# Patient Record
(demographics unavailable — no encounter records)

---

## 2025-03-07 NOTE — HISTORY OF PRESENT ILLNESS
[FreeTextEntry1] : rpa: rash on her back [de-identified] : 68F last seen June 2022 by Dr. Vazquez, presenting today for a rash on her back that started in January. Itching on the right mid-lower back started in January when she was diagnosed with cholelithiasis. Was seen in the hospital at [place] and they ruled out HZV, evaluating the itchy spot on her back. Sometimes feels burning pain. No treatments tried. No history of back surgery, nerve pain.  On Synthroid for Grave's, Prolia for osteoporosis. Sulfite and EDTA allergy.  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

## 2025-03-07 NOTE — PHYSICAL EXAM
[FreeTextEntry3] : Focused skin exam performed  The relevant portions of the exam were performed today  AAOx3, NAD, well-appearing / pleasant Focused examination within normal limits with the exception of:  Pink slightly scaly papules with some hyperpigmented macules on the right inferior scapula

## 2025-03-07 NOTE — HISTORY OF PRESENT ILLNESS
[FreeTextEntry1] : rpa: rash on her back [de-identified] : 68F last seen June 2022 by Dr. Vazquez, presenting today for a rash on her back that started in January. Itching on the right mid-lower back started in January when she was diagnosed with cholelithiasis. Was seen in the hospital at [place] and they ruled out HZV, evaluating the itchy spot on her back. Sometimes feels burning pain. No treatments tried. No history of back surgery, nerve pain.  On Synthroid for Grave's, Prolia for osteoporosis. Sulfite and EDTA allergy.  Nurse at [employer].  is an .

## 2025-03-07 NOTE — ASSESSMENT
[FreeTextEntry1] : 1. Strongly favor notalgia paresthetica Chronic, flaring - On chart review, Pt with history of L5S1 degenerative disc disease - Diagnosis reviewed.  - Start TAC 0.1% ointment BID to affected areas on body for 2 weeks max. Proper medication use and side effects discussed, including cutaneous atrophy and striae. Avoid long-term use; do not use on face, axillae, groin. Do not use as moisturizer. - Start otc Sarna lotion to affected area at least daily, can use as often as needed. Keep in the refrigerator for cooling relief.  F/u in 6 wks w/ Dr. Vazquez for f/u and FBSE as scheduled.

## 2025-03-21 NOTE — PHYSICAL EXAM
[Alert] : alert [Well Nourished] : well nourished [No Acute Distress] : no acute distress [Well Developed] : well developed [Normal Sclera/Conjunctiva] : normal sclera/conjunctiva [EOMI] : extra ocular movement intact [No Proptosis] : no proptosis [Normal Oropharynx] : the oropharynx was normal [Thyroid Not Enlarged] : the thyroid was not enlarged [No Thyroid Nodules] : no palpable thyroid nodules [No Respiratory Distress] : no respiratory distress [No Accessory Muscle Use] : no accessory muscle use [Clear to Auscultation] : lungs were clear to auscultation bilaterally [Normal S1, S2] : normal S1 and S2 [Normal Rate] : heart rate was normal [Regular Rhythm] : with a regular rhythm [No Edema] : no peripheral edema [Normal Bowel Sounds] : normal bowel sounds [Not Tender] : non-tender [Not Distended] : not distended [Soft] : abdomen soft [Normal Anterior Cervical Nodes] : no anterior cervical lymphadenopathy [No Spinal Tenderness] : no spinal tenderness [Spine Straight] : spine straight [No Stigmata of Cushings Syndrome] : no stigmata of Cushings Syndrome [Normal Gait] : normal gait [Normal Reflexes] : deep tendon reflexes were 2+ and symmetric [No Tremors] : no tremors [Oriented x3] : oriented to person, place, and time

## 2025-03-21 NOTE — HISTORY OF PRESENT ILLNESS
[FreeTextEntry1] :  Pt returns for a follow-up visit for osteoporosis. Pt began Prolia 03/2024. Pt went to [facility] 01/2025 due to upper back pain and was diagnosed with gallstones. Chest x-ray indicated no fractures. Up to date with dentist. No major dental work planned.  Pt present with low bone density for several years. Pt previously saw Dr. Conchita Jain. Dr. Jain offered treatment but pt did not begin. Pt elected to get a second opinion. Pt was never treated with any osteoporosis medication. BMD 11/2022 indicates Spine -3.4 osteoporosis. Total Hip -2.3 osteopenia. Femoral Neck -2.6 osteoporosis. BMD results reviewed w/pt.  BMD suggests that osteoporosis is present in the spine and femoral neck. No family hx of osteoporosis. The patient has no unusual risk factors for osteoporosis. Patient advised for an increased risk of future fx. Pt began Tymlos 02/2023, tolerated well. BMD 02/2024 indicates worsened osteoporosis in the spine and fem neck, stable osteopenia in total hip, osteoporosis in prox. radius. BMD results reviewed w/pt. Bone density has decreased, pt began Prolia 03/2024, tolerating well.   No thigh pain, no interval fx. Normal Ca. No ONJ. BMD 03/2025 indicates improved osteoporosis in the spine and fem neck, improved osteopenia in total hip, stable osteoporosis in prox. radius. BMD results reviewed w/pt.   Family hx of breast cancer. Pt was never offered any treatment for primary prevention for breast cancer.   History of graves disease in 2018 treated with radioactive iodine. 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  No current symptoms of hyperthyroidism or hypothyroidism.  No history of thyroid eye disease.  Menopause age 52.  Pt has no Hx of kidney stones or calcium issues. No Hx of anorexia nervosa. No ulcers or bleeding. No unusual risks for osteoporosis such as calcium disorders. No Hx of RT. No chronic prednisone use. No Hx of Paget's disease. No Hx of DVT or PE. Up to date with routine care. Menopause 52. Last DDS within 6 months   History of benign adrenal adenoma 2010 noted incidentally another imaging.  Details are not available.  No clinical suggestion of functional adenoma.  Pt had shortness of breath and went to [facility]. Pt had a full workup which was negative. Pt had a fall in April 2024 and went to [facility]. Pt had a CT scan which came back negative.

## 2025-03-21 NOTE — PROCEDURE
[FreeTextEntry1] : Bone Mineral Density: 03/21/2025 Indication: Comparison to 2024, assess response to change in medication Spine: -3.4, osteoporosis, +6.8% Total hip: -1.9, osteopenia, +9.2% Femoral neck: -2.7, osteoporosis, +7.0% Proximal radius:  -2.7, osteoporosis, no significant change  Bone Mineral Density: 02/20/2024 Indication: Comparison to outside study 2022, assess response to medication Spine: L2-4, -3.8, osteoporosis, prior report -3.4 Total hip: -2.4, osteopenia, no significant change Femoral neck: -3.0, osteoporosis, prior report -2.6, change in analysis          Proximal radius: -2.6, osteoporosis, no prior   Thoracic and lumbar spine x-ray December 2022: no fractures   Bone Density November 2022  Spine -3.4 osteoporosis  Total Hip -2.3 osteopenia  Femoral Neck -2.6 osteoporosis

## 2025-03-21 NOTE — ASSESSMENT
[Denosumab Therapy] : Risks  and benefits of denosumab therapy were discussed with the patient including eczema, cellulitis, osteonecrosis of the jaw and atypical femur fractures [FreeTextEntry1] : 67 y/o female returns for a follow-up visit for osteoporosis.  Pt present with low bone density for several years. Pt previously saw Dr. Conchita Jain. Dr. Jain offered treatment but pt did not begin. Pt elected to get a second opinion. Pt was never treated with any osteoporosis medication. BMD 11/2022 indicates Spine -3.4 osteoporosis. Total Hip -2.3 osteopenia. Femoral Neck -2.6 osteoporosis. BMD results reviewed w/pt.  BMD suggests that osteoporosis is present in the spine and femoral neck. No family hx of osteoporosis.  The patient has no unusual risk factors for osteoporosis. Patient advised for an increased risk of future fx. Pt began Tymlos 02/2023, tolerated well. BMD 02/2024 indicates worsened osteoporosis in the spine and fem neck, stable osteopenia in total hip, osteoporosis in prox. radius. BMD results reviewed w/pt. Bone density has decreased, pt began Prolia 03/2024, tolerating well.   No thigh pain, no interval fx. Normal Ca. No ONJ. BMD 03/2025 indicates improved osteoporosis in the spine and fem neck, improved osteopenia in total hip, stable osteoporosis in prox. radius. BMD results reviewed w/pt. Continue Prolia from [facility].   History of post ablative hypothyroidism after radioactive iodine for Graves' disease.  Clinically and biochemically euthyroid on levothyroxine 88 mcg/day.  Family history of breast cancer in sister. Medical therapy as primary prevention of breast cancer including tamoxifen and raloxifene discussed.  Although raloxifene is FDA approved for osteoporosis this would not be adequately potent enough given her osteoporosis, but she can consider it for primary prevention.  Risks and benefits reviewed patient wishes to consider this further.  Labs: January 2025 Creatinine: 0.79  Calcium: 10.0  Request labs sent out   F/u in 6 months for Prolia

## 2025-03-21 NOTE — END OF VISIT
[FreeTextEntry3] :  This note was written by Lalita Grace on (March 21, 2025) acting as a medical scribe for Dr. Grant This note was authored by the medical scribe for me. I have reviewed, edited, and revised the note as needed. I am in agreement with the exam findings, imaging findings, and treatment plan.  Jey Grant MD

## 2025-04-23 NOTE — ASSESSMENT
[FreeTextEntry1] : 1. Strongly favor notalgia paresthetica Chronic, flaring - On chart review, Pt with history of L5S1 degenerative disc disease - Diagnosis reviewed.  - Start otc Sarna lotion to affected area at least daily, can use as often as needed. Keep in the refrigerator for cooling relief. - failed TAC  2. benign findings as above  3. ISK The specified lesions were treated with liquid nitrogen cryotherapy.  Discussed risks including pain, blistering, crusting, discoloration, recurrence.

## 2025-04-23 NOTE — PHYSICAL EXAM
[FreeTextEntry3] : AAOx3, pleasant, NAD, no visual lymphadenopathy hair, scalp, face, nose, eyelids, ears, lips, oropharynx, neck, chest, abdomen, back, right arm, left arm, nails, and hands examined with all normal findings, pertinent findings include:  multiple benign nevi and lentigines + inflamed, waxy, keratotic papule on left groin

## 2025-04-23 NOTE — HISTORY OF PRESENT ILLNESS
[FreeTextEntry1] : skin check [de-identified] : 68 year old. skin check. rash on back. recurring, growing spot on left groin.